# Patient Record
Sex: FEMALE | Race: WHITE | URBAN - METROPOLITAN AREA
[De-identification: names, ages, dates, MRNs, and addresses within clinical notes are randomized per-mention and may not be internally consistent; named-entity substitution may affect disease eponyms.]

---

## 2017-10-15 ENCOUNTER — NURSE TRIAGE (OUTPATIENT)
Dept: NURSING | Facility: CLINIC | Age: 32
End: 2017-10-15

## 2017-10-15 NOTE — TELEPHONE ENCOUNTER
Patient calling reporting she had a miscarriage at 9 weeks in pregnancy that started 2 weeks ago with bleeding. Reporting vaginal bleeding started to increase again today.  Stating she is using cloth pads and is going through 1 pad per day. Abdominal cramping that is less then a menstrual cycle.   Patient will schedule a follow up appointment in morning. Reviewed to call back with any change in symptoms prior to appointment.    Reason for Disposition    Symptoms worsening  (i.e., vaginal bleeding, abdominal pain)    Additional Information    Negative: [1] Threatened miscarriage (threatened ) suspected AND [2] has not been examined by a HCP    Negative: [1] Abdominal pain is main symptom and [2] NO vaginal bleeding in past 24 hours    Negative: [1] Vaginal bleeding is main symptom and [2] more than 4 weeks since medical visit    Negative: Not pregnant or pregnancy status unknown    Negative: SEVERE abdominal pain    Negative: [1] SEVERE vaginal bleeding(i.e., soaking 2 pads / hour, large blood clots) AND [2] present 2 or more hours    Negative: [1] MODERATE vaginal bleeding (i.e., soaking 1 pad / hour; clots) AND [2] present > 6 hours    Negative: Passed tissue (e.g., gray-white)    Negative: Pale skin (pallor) of new onset or worsening    Negative: Patient sounds very sick or weak to the triager    Negative: [1] Constant abdominal pain AND [2] present > 2 hours    Negative: Caller has URGENT question and triager unable to answer question    Negative: Caller has NON-URGENT question and triager unable to answer question    Negative: MILD vaginal bleeding (i.e., less than one pad / hour)    Negative: Shock suspected (e.g., cold/pale/clammy skin, too weak to stand, low BP, rapid pulse)    Negative: Difficult to awaken or acting confused  (e.g., disoriented, slurred speech)    Negative: Passed out (i.e., lost consciousness, collapsed and was not responding)    Negative: Sounds like a life-threatening emergency to  the triager    Protocols used:  - THREATENED MISCARRIAGE FOLLOW-UP CALL-ADULT-AH

## 2017-10-26 ENCOUNTER — NURSE TRIAGE (OUTPATIENT)
Dept: NURSING | Facility: CLINIC | Age: 32
End: 2017-10-26

## 2017-10-26 NOTE — TELEPHONE ENCOUNTER
Pt states 2 days ago she began having some burning pain w/ urination. Since then this pain has become worse and c/o urinary frequency. Today also has lower back pain and mild lower abd pain. Thinks she may have fever but cannot locate thermometer. Pt states she had a miscarriage 3 weeks ago. Advised see provider within 4 hours per triage guideline. Advised [facility] but pt states she wants to go to an UC tonight. Informed [facility] has no official UC but she may go to any UC. Lives [facility]. Informed about [facility] open until 9pm - this is one option. Advised though to go to a clinic or hospital based Urgent Care, not a retail clinic ([facility]etc) as she needs a higher level of care w/ these symptoms. Zulay Coy RN/RICHI    Reason for Disposition    Side (flank) or lower back pain present    Additional Information    Negative: Shock suspected (e.g., cold/pale/clammy skin, too weak to stand, low BP, rapid pulse)    Negative: Sounds like a life-threatening emergency to the triager    [1] Discomfort (pain, burning or stinging) when passing urine AND [2] female    Negative: Shock suspected (e.g., cold/pale/clammy skin, too weak to stand, low BP, rapid pulse)    Negative: Sounds like a life-threatening emergency to the triager    Negative: Followed a genital area injury    Negative: Taking antibiotic for urinary tract infection (UTI)    Negative: Pregnant    Negative: Postpartum < 1 month    Negative: [1] Unable to urinate (or only a few drops) > 4 hours AND     [2] bladder feels very full (e.g., palpable bladder or strong urge to urinate)    Negative: Patient sounds very sick or weak to the triager    Negative: [1] SEVERE pain with urination  (e.g., excruciating) AND [2] not improved after 2 hours of pain medicine and Sitz bath    Commented on: Fever > 100.5 F (38.1 C)     Temp unknown. Thermometer not available.    Protocols used: URINATION PAIN - FEMALE-ADULT-AH, URINARY SYMPTOMS-ADULT-AH

## 2018-04-01 ENCOUNTER — HEALTH MAINTENANCE LETTER (OUTPATIENT)
Age: 33
End: 2018-04-01

## 2018-07-18 ENCOUNTER — TELEPHONE (OUTPATIENT)
Dept: OPHTHALMOLOGY | Facility: CLINIC | Age: 33
End: 2018-07-18

## 2018-07-18 NOTE — TELEPHONE ENCOUNTER
Pt in town Friday  Reviewed with cornea clinic and no availabilty     Scheduled pt august 3rd when back in [facility]    Pt satisfied with date/time/location at [facility]  Callum Jackson RN 4:23 PM 07/18/18

## 2018-07-18 NOTE — TELEPHONE ENCOUNTER
Call center received call about second opinion salzamann's nodular dystrophy    Requesting 7-20-18 if able    --    Called pt to review scheduling options  Not able to schedule with cornea provider that day per cornea clinic    Left message with direct number to review scheduling options  Callum Jackson RN 11:54 AM 07/18/18

## 2018-08-03 ENCOUNTER — OFFICE VISIT (OUTPATIENT)
Dept: OPHTHALMOLOGY | Facility: CLINIC | Age: 33
End: 2018-08-03
Attending: OPHTHALMOLOGY
Payer: COMMERCIAL

## 2018-08-03 DIAGNOSIS — H18.459 SALZMANN NODULAR DEGENERATION: Primary | ICD-10-CM

## 2018-08-03 PROCEDURE — G0463 HOSPITAL OUTPT CLINIC VISIT: HCPCS | Mod: ZF

## 2018-08-03 RX ORDER — CETIRIZINE HYDROCHLORIDE 10 MG/1
1 TABLET ORAL PRN
COMMUNITY
Start: 2001-07-01

## 2018-08-03 RX ORDER — PSEUDOEPHEDRINE HCL 120 MG/1
1 TABLET, FILM COATED, EXTENDED RELEASE ORAL PRN
COMMUNITY
Start: 2011-03-27

## 2018-08-03 RX ORDER — MEDROXYPROGESTERONE ACETATE 10 MG
10 TABLET ORAL DAILY
Refills: 0 | COMMUNITY
Start: 2017-12-12

## 2018-08-03 ASSESSMENT — REFRACTION_WEARINGRX
OD_SPHERE: +0.50
OD_CYLINDER: +0.75
OD_AXIS: 106
SPECS_TYPE: SVL
OS_SPHERE: +1.75
OS_AXIS: 009
OS_CYLINDER: +0.25

## 2018-08-03 ASSESSMENT — EXTERNAL EXAM - RIGHT EYE: OD_EXAM: NORMAL

## 2018-08-03 ASSESSMENT — VISUAL ACUITY
OD_CC+: +2
OS_CC: 20/20
OD_CC: 20/20
METHOD: SNELLEN - LINEAR
CORRECTION_TYPE: GLASSES

## 2018-08-03 ASSESSMENT — CONF VISUAL FIELD
OD_NORMAL: 1
OS_NORMAL: 1

## 2018-08-03 ASSESSMENT — EXTERNAL EXAM - LEFT EYE: OS_EXAM: NORMAL

## 2018-08-03 ASSESSMENT — TONOMETRY
OS_IOP_MMHG: 16
OD_IOP_MMHG: 14
IOP_METHOD: ICARE

## 2018-08-03 NOTE — PROGRESS NOTES
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 Elizondo in [place], [place]. 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 Feels much better since starting treatment.     Interval: Feeling well today but with mild FBS each eye. Vision stable. No recent flashes/floaters.     POH:  -no prior eye laser treatment/surgery   -no known fam hx of eye disease  -wore soft contact lenses about 6 years ago, stopped due to moving and unable to follow up with doctor    PMH:  -mod-severe environmental allergies, on zyrtec, flonase, sudafed, ketotifen, teatree oil    Gtts/Meds:  -teatree oil eyelid and lash cleansing pads each eye - started 1 week ago  -Omega 3 fatty acids (1500mg TID) - started 1 week ago  -ketotifen gtts (1x/day) - started 1 week ago  -occasional lubricating tears maybe 1x/daily, if that    Allergies:   -cat, dust, mold, pollen, horses, rabbits    A/P:    1. Bilateral corneal opacities    2. Blepharitis, both eyes    -epithelial in nature, do not involve stroma/endo  -larger right eye than left eye - both stable after 4 week follow up with Dr. 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 I personally obtained the chief complaint(s) and history of present illness.  I confirmed and edited as necessary the review of systems, past medical/surgical history, family history, social history, and examination findings as documented by others.  I examined the patient myself, and I personally reviewed the relevant tests, images, and reports as documented above. I formulated and edited as necessary the assessment and plan and discussed the findings and management plan with the patient and family.     Edwar Baxter MD, MA  Director, Cornea & Anterior Segment  [facility] Department of Ophthalmology & Visual Neuroscience

## 2018-08-03 NOTE — NURSING NOTE
Chief Complaints and History of Present Illnesses   Patient presents with     Consult For     Continue artificial tears 4x daily, warm compresses, Omega 3-FA 1-2gm daily     HPI    Affected eye(s):  Both   Symptoms:     Floaters (Comment: Rare BE)   No flashes   Itching   Burning   No photophobia      Frequency:  Intermittent       Do you have eye pain now?:  Yes   Location:  OS   Pain Level:  No Pain (1)   Pain Duration:  2 months   Pain Frequency:  Intermittent   Pain Characteristics:  Aching      Comments:  Consult second opinion Salzamanns nodular dystrophy  Colette Porras COA 9:16 AM August 3, 2018

## 2018-08-03 NOTE — MR AVS SNAPSHOT
After Visit Summary   8/3/2018    Naomi KRUGER    MRN: [id number]           Patient Information     Date Of Birth          1985        Visit Information        Provider Department      8/3/2018 8:30 AM Edwar Baxter MD [facility]        Today's Diagnoses     Salzmann nodular degeneration - Both Eyes    -  1       Follow-ups after your visit        Who to contact     Please call your clinic at [phone] to:    Ask questions about your health    Make or cancel appointments    Discuss your medicines    Learn about your test results    Speak to your doctor            Additional Information About Your Visit        MyChart Information     [employer] ([employer]) gives you secure access to your electronic health record. If you see a primary care provider, you can also send messages to your care team and make appointments. If you have questions, please call your primary care clinic.  If you do not have a primary care provider, please call [phone] and they will assist you.      [employer] ([employer]) is an electronic gateway that provides easy, online access to your medical records. With [employer] (SAM), you can request a clinic appointment, read your test results, renew a prescription or communicate with your care team.     To access your existing account, please contact your [facility] or call [phone] for assistance.        Care EveryWhere ID     This is your Care EveryWhere ID. This could be used by other organizations to access your [facility] medical records  [id number]         Blood Pressure from Last 3 Encounters:   No data found for BP    Weight from Last 3 Encounters:   No data found for Wt              Today, you had the following     No orders found for display       Primary Care Provider    None Specified       No primary provider on file.        Equal Access to Services     SANDI JUDGE : jacqui Pappas qaybta kaalmada adeegyada,  bettina gomezvida stewart'aan ah. So [facility] [phone].    ATENCIÓN: Si habla cata, tiene a del rosario disposición servicios gratuitos de asistencia lingüística. Chris gotti [phone].    We comply with applicable federal civil rights laws and [place] laws. We do not discriminate on the basis of race, color, national origin, age, disability, sex, sexual orientation, or gender identity.            Thank you!     Thank you for choosing [facility]  for your care. Our goal is always to provide you with excellent care. Hearing back from our patients is one way we can continue to improve our services. Please take a few minutes to complete the written survey that you may receive in the mail after your visit with us. Thank you!             Your Updated Medication List - Protect others around you: Learn how to safely use, store and throw away your medicines at www.disposemymeds.org.          This list is accurate as of 8/3/18 10:57 AM.  Always use your most recent med list.                   Brand Name Dispense Instructions for use Diagnosis    cetirizine 10 MG tablet    zyrTEC     Take 1 tablet by mouth as needed        ketotifen 0.025 % Soln ophthalmic solution    ZADITOR/REFRESH ANTI-ITCH     Apply 1 drop to eye as needed 0.035%        medroxyPROGESTERone 10 MG tablet    PROVERA     Take 10 mg by mouth daily        pseudoePHEDrine 120 MG 12 hr tablet    SUDAFED     Take 1 tablet by mouth as needed

## 2020-03-11 ENCOUNTER — HEALTH MAINTENANCE LETTER (OUTPATIENT)
Age: 35
End: 2020-03-11

## 2020-03-21 ENCOUNTER — VIRTUAL VISIT (OUTPATIENT)
Dept: FAMILY MEDICINE | Facility: OTHER | Age: 35
End: 2020-03-21

## 2020-03-21 ENCOUNTER — NURSE TRIAGE (OUTPATIENT)
Dept: NURSING | Facility: CLINIC | Age: 35
End: 2020-03-21

## 2020-03-21 NOTE — TELEPHONE ENCOUNTER
"    Reason for Disposition    [1] After 5 days of treatment per [facility] Care Advice AND [2] not better    Additional Information    Negative: Patient sounds very sick or weak to the triager    Negative: [1] Eyelid is red AND [2] fever    Negative: [1] Eyelid is swollen AND [2] fever    Negative: Redness spreads around the eye (both upper and lower eyelid are red)    Negative: [1] Blurred vision AND [2] new or worsening    Negative: 2 or more styes are present    Negative: [1] Eyelid is very swollen AND [2] no fever    Answer Assessment - Initial Assessment Questions  1. LOCATION: \"Which eye has the sty?\" \"Upper or lower eyelid?\"      Left, upper  2. SIZE: \"How big is it?\" (Note: standard pencil eraser is 6 mm)      4mm  3. EYELID: \"Is the eyelid swollen?\" If so, ask: \"How much?\"      Yes, medial upper corner  4. REDNESS: \"Has the redness spread onto the eyelid?\"      Very red, some crusty discharge  5. ONSET: \"When did you notice the sty?\"      A week ago  6. VISION: \"Do you have blurred vision?\"       ok  7. PAIN: \"Is it painful?\" If so, ask: \"How bad is the pain?\"  (Scale 1-10; or mild, moderate, severe)      6/10  8. CONTACTS: \"Do you wear contacts?\"      no  9. OTHER SYMPTOMS: \"Do you have any other symptoms?\" (e.g., fever)      Allergy symptoms, no fever  10. PREGNANCY: \"Is there any chance you are pregnant?\" \"When was your last menstrual period?\"        no    Protocols used: STY-A-AH      "

## 2020-03-21 NOTE — PROGRESS NOTES
"Date: 2020 12:47:00  Clinician: Terrence Fung  Clinician NPI: [id number]  Patient: Naomi Lock  Patient : 1985  Patient Address[address] [address] [place], [place] [address] [address]  Patient Phone: [phone]  Visit Protocol: Eye conditions  Patient Summary:  Naomi Berry is a 34 year old (: 1985 ) female who initiated a Visit for stye.  When asked the question \"Please sign me up to receive news, health information and promotions. \", Naomi Berry responded \"No\".    Images of her eye condition were uploaded.   Her symptoms started 1-2 weeks ago and affect the left eye. The symptoms consist of eyelid swelling, itchy eye(s), eye pain, and drainage coming from the eye(s).   Symptom details     Drainage: The color of the drainage coming out of her eye(s) is clear. The drainage is watery and causes her eyelids to be stuck shut in the morning.    Itchiness: Naomi Berry has seasonal allergies or hay fever.    Eye pain: She is experiencing moderate eye pain (4-6 on a 10 point pain scale). She has taken over-the-counter medication for the pain.     Denied symptoms include light sensitivity, bumps on the eyelid, and eye redness. Naomi Berry does not have subconjunctival hemorrhage and has not experienced a decrease in vision. She does not feel feverish.   Precipitating events   She has not had a recent cold or ear infection, eye surgery, foreign body in the eye(s), and eye injury. She does not wear contact lenses.   Pertinent medical history  Naomi Berry has not ever been diagnosed with glaucoma.   Naomi Berry has been using Ketotifen ophthalmic solution to treat her current symptoms.   Medication efficacy as reported by the patient (free text): It helps relieve itch and pain. Significant swelling and redness of my upper, medial eyelid, outside the eyelash margin, remains (  5mm).   Naomi Berry does not require proof of evaluation of her eye condition before returning to school, work, or .   " Naomi Berry does not smoke or use smokeless tobacco.   She denies pregnancy and is breastfeeding. She does not menstruate.   Additional information as reported by the patient (free text): Soreness and increased discharge at the base of my eyelashes on my upper left eyelid started last Wednesday/Thursday. It has gotten worse, especially the last two days. My upper left eyelid is very swollen medially above the eyelash line. There is no discrete bump or marie. I have been using regular warm water compresses and ketotifen eyedrops 3-4 times per day. This morning the focal, sharp, almost burning pain in my eyelid woke me up. The lower lid, conjunctiva, and sclera are unaffected.     MEDICATIONS: Xyzal oral, pseudoephedrine oral, fluticasone propionate nasal, naproxen sodium oral, ALLERGIES: NKDA  Clinician Response:  Dear Naomi Berry,   Based on your pictures and description of your symptoms, you may have an infection of the skin around the eye. I sent in oral antibiotics to treat this. You should notice improvement within 24-36 hours of starting this. If you do not, or if it is painful to move your eye, go to the ER.&nbsp;  You can continue to use your allergy eye drops as well.  This medication can cause diarrhea in your child since you are breastfeeding but it is safe to continue doing so.    Diagnosis: Periorbital cellulitis  Diagnosis ICD: L03.213  Prescription: clindamycin HCl (Cleocin HCl) 300 mg oral capsule 30 capsule, 10 days supply. Take 1 capsule by mouth every 8 hours for 10 days. Refills: 0, Refill as needed: no, Allow substitutions: yes  Pharmacy: [facility] - [phone] - [address], [place] [address]

## 2020-12-27 ENCOUNTER — HEALTH MAINTENANCE LETTER (OUTPATIENT)
Age: 35
End: 2020-12-27

## 2021-04-25 ENCOUNTER — HEALTH MAINTENANCE LETTER (OUTPATIENT)
Age: 36
End: 2021-04-25

## 2021-10-09 ENCOUNTER — HEALTH MAINTENANCE LETTER (OUTPATIENT)
Age: 36
End: 2021-10-09

## 2022-05-21 ENCOUNTER — HEALTH MAINTENANCE LETTER (OUTPATIENT)
Age: 37
End: 2022-05-21

## 2022-09-17 ENCOUNTER — HEALTH MAINTENANCE LETTER (OUTPATIENT)
Age: 37
End: 2022-09-17

## 2023-06-04 ENCOUNTER — HEALTH MAINTENANCE LETTER (OUTPATIENT)
Age: 38
End: 2023-06-04